# Patient Record
Sex: FEMALE | Race: WHITE | Employment: UNEMPLOYED | URBAN - METROPOLITAN AREA
[De-identification: names, ages, dates, MRNs, and addresses within clinical notes are randomized per-mention and may not be internally consistent; named-entity substitution may affect disease eponyms.]

---

## 2022-04-08 ENCOUNTER — HOSPITAL ENCOUNTER (OUTPATIENT)
Dept: LAB | Age: 67
Discharge: HOME | End: 2022-04-08
Payer: MEDICARE

## 2022-04-08 LAB
25(OH)D3 SERPL-MCNC: 32.7 NG/ML (ref 30–100)
ALBUMIN SERPL-MCNC: 4.2 G/DL (ref 3.4–5)
ALBUMIN/GLOB SERPL: 1.5 {RATIO} (ref 0.8–1.7)
ALP SERPL-CCNC: 75 U/L (ref 45–117)
ALT SERPL-CCNC: 30 U/L (ref 13–56)
ANION GAP SERPL CALC-SCNC: 10 MMOL/L (ref 3–18)
APPEARANCE UR: ABNORMAL
AST SERPL-CCNC: 26 U/L (ref 10–38)
BACTERIA URNS QL MICRO: NEGATIVE /HPF
BILIRUB SERPL-MCNC: 0.5 MG/DL (ref 0.2–1)
BILIRUB UR QL: NEGATIVE
BUN SERPL-MCNC: 39 MG/DL (ref 7–18)
BUN/CREAT SERPL: 29 (ref 12–20)
CALCIUM SERPL-MCNC: 9.8 MG/DL (ref 8.5–10.1)
CHLORIDE SERPL-SCNC: 114 MMOL/L (ref 100–111)
CHOLEST SERPL-MCNC: 153 MG/DL
CO2 SERPL-SCNC: 22 MMOL/L (ref 21–32)
COLOR UR: ABNORMAL
CREAT SERPL-MCNC: 1.33 MG/DL (ref 0.6–1.3)
CRP SERPL HS-MCNC: 0.3 MG/L
EPITH CASTS URNS QL MICRO: NORMAL /LPF (ref 0–5)
ERYTHROCYTE [DISTWIDTH] IN BLOOD BY AUTOMATED COUNT: 13.1 % (ref 11.6–14.5)
ERYTHROCYTE [SEDIMENTATION RATE] IN BLOOD: 10 MM/HR (ref 0–30)
FERRITIN SERPL-MCNC: 126 NG/ML (ref 8–388)
FOLATE SERPL-MCNC: >20 NG/ML (ref 3.1–17.5)
GLOBULIN SER CALC-MCNC: 2.8 G/DL (ref 2–4)
GLUCOSE SERPL-MCNC: 91 MG/DL (ref 74–99)
GLUCOSE UR STRIP.AUTO-MCNC: NEGATIVE MG/DL
HCT VFR BLD AUTO: 33.8 % (ref 35–45)
HDLC SERPL-MCNC: 65 MG/DL (ref 40–60)
HDLC SERPL: 2.4 {RATIO} (ref 0–5)
HGB BLD-MCNC: 10.9 G/DL (ref 12–16)
HGB UR QL STRIP: NEGATIVE
KETONES UR QL STRIP.AUTO: ABNORMAL MG/DL
LDLC SERPL CALC-MCNC: 77.4 MG/DL (ref 0–100)
LEUKOCYTE ESTERASE UR QL STRIP.AUTO: NEGATIVE
LIPID PROFILE,FLP: ABNORMAL
MAGNESIUM SERPL-MCNC: 2.4 MG/DL (ref 1.6–2.6)
MCH RBC QN AUTO: 31.6 PG (ref 24–34)
MCHC RBC AUTO-ENTMCNC: 32.2 G/DL (ref 31–37)
MCV RBC AUTO: 98 FL (ref 78–100)
NITRITE UR QL STRIP.AUTO: NEGATIVE
NRBC # BLD: 0 K/UL (ref 0–0.01)
NRBC BLD-RTO: 0 PER 100 WBC
PH UR STRIP: 5 [PH] (ref 5–8)
PLATELET # BLD AUTO: 253 K/UL (ref 135–420)
PMV BLD AUTO: 13.5 FL (ref 9.2–11.8)
POTASSIUM SERPL-SCNC: 3.7 MMOL/L (ref 3.5–5.5)
PROT SERPL-MCNC: 7 G/DL (ref 6.4–8.2)
PROT UR STRIP-MCNC: ABNORMAL MG/DL
RBC # BLD AUTO: 3.45 M/UL (ref 4.2–5.3)
RBC #/AREA URNS HPF: NEGATIVE /HPF (ref 0–5)
RHEUMATOID FACT SERPL-ACNC: <10 IU/ML
SODIUM SERPL-SCNC: 146 MMOL/L (ref 136–145)
SP GR UR REFRACTOMETRY: 1.02 (ref 1–1.03)
TRIGL SERPL-MCNC: 53 MG/DL (ref ?–150)
TSH SERPL DL<=0.05 MIU/L-ACNC: 0.88 UIU/ML (ref 0.36–3.74)
UROBILINOGEN UR QL STRIP.AUTO: 0.2 EU/DL (ref 0.2–1)
VIT B12 SERPL-MCNC: 953 PG/ML (ref 211–911)
VLDLC SERPL CALC-MCNC: 10.6 MG/DL
WBC # BLD AUTO: 8.6 K/UL (ref 4.6–13.2)
WBC URNS QL MICRO: NEGATIVE /HPF (ref 0–4)

## 2022-04-08 PROCEDURE — 82626 DEHYDROEPIANDROSTERONE: CPT

## 2022-04-08 PROCEDURE — 83735 ASSAY OF MAGNESIUM: CPT

## 2022-04-08 PROCEDURE — 81001 URINALYSIS AUTO W/SCOPE: CPT

## 2022-04-08 PROCEDURE — 86003 ALLG SPEC IGE CRUDE XTRC EA: CPT

## 2022-04-08 PROCEDURE — 82607 VITAMIN B-12: CPT

## 2022-04-08 PROCEDURE — 82728 ASSAY OF FERRITIN: CPT

## 2022-04-08 PROCEDURE — 86200 CCP ANTIBODY: CPT

## 2022-04-08 PROCEDURE — 36415 COLL VENOUS BLD VENIPUNCTURE: CPT

## 2022-04-08 PROCEDURE — 83789 MASS SPECTROMETRY QUAL/QUAN: CPT

## 2022-04-08 PROCEDURE — 86225 DNA ANTIBODY NATIVE: CPT

## 2022-04-08 PROCEDURE — 85027 COMPLETE CBC AUTOMATED: CPT

## 2022-04-08 PROCEDURE — 85652 RBC SED RATE AUTOMATED: CPT

## 2022-04-08 PROCEDURE — 84402 ASSAY OF FREE TESTOSTERONE: CPT

## 2022-04-08 PROCEDURE — 82784 ASSAY IGA/IGD/IGG/IGM EACH: CPT

## 2022-04-08 PROCEDURE — 82306 VITAMIN D 25 HYDROXY: CPT

## 2022-04-08 PROCEDURE — 84443 ASSAY THYROID STIM HORMONE: CPT

## 2022-04-08 PROCEDURE — 84146 ASSAY OF PROLACTIN: CPT

## 2022-04-08 PROCEDURE — 80061 LIPID PANEL: CPT

## 2022-04-08 PROCEDURE — 86376 MICROSOMAL ANTIBODY EACH: CPT

## 2022-04-08 PROCEDURE — 80053 COMPREHEN METABOLIC PANEL: CPT

## 2022-04-08 PROCEDURE — 86431 RHEUMATOID FACTOR QUANT: CPT

## 2022-04-08 PROCEDURE — 86141 C-REACTIVE PROTEIN HS: CPT

## 2022-04-09 LAB — PROLACTIN SERPL-MCNC: 8.8 NG/ML

## 2022-04-10 LAB — THYROPEROXIDASE AB SERPL-ACNC: <8 IU/ML (ref 0–34)

## 2022-04-11 LAB
CENTROMERE B AB SER-ACNC: <0.2 AI (ref 0–0.9)
CHROMATIN AB SERPL-ACNC: <0.2 AI (ref 0–0.9)
DSDNA AB SER-ACNC: 1 IU/ML (ref 0–9)
ENA JO1 AB SER-ACNC: <0.2 AI (ref 0–0.9)
ENA RNP AB SER-ACNC: 0.2 AI (ref 0–0.9)
ENA SCL70 AB SER-ACNC: <0.2 AI (ref 0–0.9)
ENA SM AB SER-ACNC: <0.2 AI (ref 0–0.9)
ENA SS-A AB SER-ACNC: <0.2 AI (ref 0–0.9)
ENA SS-B AB SER-ACNC: <0.2 AI (ref 0–0.9)
SEE BELOW, 164869: NORMAL

## 2022-04-12 LAB
ALBUMIN SERPL ELPH-MCNC: 4.2 G/DL (ref 2.9–4.4)
ALBUMIN/GLOB SERPL: 1.7 {RATIO} (ref 0.7–1.7)
ALPHA1 GLOB SERPL ELPH-MCNC: 0.3 G/DL (ref 0–0.4)
ALPHA2 GLOB SERPL ELPH-MCNC: 0.7 G/DL (ref 0.4–1)
B-GLOBULIN SERPL ELPH-MCNC: 0.9 G/DL (ref 0.7–1.3)
GAMMA GLOB SERPL ELPH-MCNC: 0.7 G/DL (ref 0.4–1.8)
GLOBULIN SER-MCNC: 2.6 G/DL (ref 2.2–3.9)
IGA SERPL-MCNC: 147 MG/DL (ref 87–352)
IGG SERPL-MCNC: 696 MG/DL (ref 586–1602)
IGM SERPL-MCNC: 114 MG/DL (ref 26–217)
INTERPRETATION SERPL IEP-IMP: NORMAL
M PROTEIN SERPL ELPH-MCNC: NORMAL G/DL
PROT SERPL-MCNC: 6.8 G/DL (ref 6–8.5)

## 2022-04-13 LAB
CCP IGA+IGG SERPL IA-ACNC: 8 UNITS (ref 0–19)
TESTOST FREE SERPL-MCNC: 1.8 PG/ML (ref 0–4.2)

## 2022-04-15 LAB — IODINE SERPL-MCNC: 49.3 UG/L (ref 40–92)

## 2022-04-17 LAB — DHEA SERPL-MCNC: 192 NG/DL (ref 31–701)

## 2022-04-19 LAB
ALMOND IGE QN: <0.1 KU/L
BRAZIL NUT: <0.1 KU/L
CASHEW NUT: <0.1 KU/L
CLAM IGE QN: <0.1 KU/L
CLASS DESCRIPTION, 600268: NORMAL
CODFISH IGE QN: <0.1 KU/L
CORN IGE QN: <0.1 KU/L
EGG WHITE,FOOD4: <0.1 KU/L
F013 IGE PEANUT: <0.1 KU/L
HAZELNUT (FILBERT): <0.1 KU/L
MACADAMIA IGE QN: <0.1 KU/L
MILK,FOOD1: <0.1 KU/L
PECAN/HICK NUT IGE QN: <0.1 KU/L
PISTACHIO IGE QN: <0.1 KU/L
SCALLOP IGE QN: <0.1 KU/L
SESAME SEED IGE QN: <0.1 KU/L
SHRIMP IGE QN: <0.1 KU/L
SOYBEAN IGE QN: <0.1 KU/L
WALNUT: <0.1 KU/L
WHEAT IGE QN: <0.1 KU/L

## 2025-02-24 ENCOUNTER — OFFICE VISIT (OUTPATIENT)
Age: 70
End: 2025-02-24
Payer: MEDICARE

## 2025-02-24 VITALS — TEMPERATURE: 99.3 F | WEIGHT: 135 LBS | HEIGHT: 67 IN | BODY MASS INDEX: 21.19 KG/M2

## 2025-02-24 DIAGNOSIS — G89.29 CHRONIC PAIN OF RIGHT KNEE: ICD-10-CM

## 2025-02-24 DIAGNOSIS — M25.461 EFFUSION OF RIGHT KNEE: ICD-10-CM

## 2025-02-24 DIAGNOSIS — M25.561 CHRONIC PAIN OF RIGHT KNEE: ICD-10-CM

## 2025-02-24 DIAGNOSIS — M25.661 DECREASED RANGE OF MOTION (ROM) OF RIGHT KNEE: ICD-10-CM

## 2025-02-24 DIAGNOSIS — M23.8X1 CREPITUS OF JOINT OF RIGHT KNEE: ICD-10-CM

## 2025-02-24 DIAGNOSIS — M17.11 ARTHRITIS OF RIGHT KNEE: Primary | ICD-10-CM

## 2025-02-24 DIAGNOSIS — M21.161 VARUS DEFORMITY, NOT ELSEWHERE CLASSIFIED, RIGHT KNEE: ICD-10-CM

## 2025-02-24 PROCEDURE — 73562 X-RAY EXAM OF KNEE 3: CPT | Performed by: PHYSICIAN ASSISTANT

## 2025-02-24 PROCEDURE — 99204 OFFICE O/P NEW MOD 45 MIN: CPT | Performed by: PHYSICIAN ASSISTANT

## 2025-02-24 PROCEDURE — 1125F AMNT PAIN NOTED PAIN PRSNT: CPT | Performed by: PHYSICIAN ASSISTANT

## 2025-02-24 PROCEDURE — 1123F ACP DISCUSS/DSCN MKR DOCD: CPT | Performed by: PHYSICIAN ASSISTANT

## 2025-02-24 NOTE — PROGRESS NOTES
[facility] AMBULATORY OFFICE NOTE    Patient: Vanessa Martinez                MRN: [id number]       SSN: [id number]  YOB: 1955        AGE: 69 y.o.        SEX: female      OFFICE NOTE DICTATED    PCP: Alisha Thornton MD  02/24/25    Chief Complaint   Patient presents with    Knee Pain     right       HISTORY:    Vanessa Martinez is a 69 y.o. female presents to the office accompanied by her  with a complaint of worsening right inner knee pain.  She was seen previous by [facility] and provided gel injections for osteoarthritic symptoms of the right knee.  They were not successful in managing her symptoms.  She does use an advanced a rollator for ambulation assistance.  She has a history of Parkinson's.  She is currently in physical therapy for balance gait training and Parkinson's specific activities.  She does have a paddle device that she uses twice daily for mild cardiovascular activities.  She is a limited home ambulator.  Pain is primarily over the inner portion of her right knee.  She has a history of using cortisone injections for her knee pain which have been generally unsuccessful.        No results found for: \"HBA1C\", \"[id number]\"      2/24/2025     2:14 PM 12/17/2024     3:40 PM 6/17/2024     2:42 PM 3/2/2023     2:34 PM 9/17/2021    10:30 AM 3/23/2021     9:38 AM   Weight Metrics   Weight 135 lb 135 lb 135 lb 130 lb 130 lb 130 lb   BMI (Calculated) 21.2 kg/m2 21.2 kg/m2 21.2 kg/m2 20.4 kg/m2 20.4 kg/m2 20.4 kg/m2          Problem List Items Addressed This Visit    None  Visit Diagnoses       Chronic pain of right knee    -  Primary    Relevant Orders    [47840] Knee 3V (Completed)    Varus deformity, not elsewhere classified, right knee        Crepitus of joint of right knee        Decreased range of motion (ROM) of right knee        Effusion of right knee        Arthritis of right knee                PAST MEDICAL HISTORY:       Diagnosis Date

## 2025-02-27 ENCOUNTER — TELEPHONE (OUTPATIENT)
Age: 70
End: 2025-02-27

## 2025-02-27 NOTE — TELEPHONE ENCOUNTER
Patient says it should have been an rx for Voltaren gel sent to the pharmacy.  She says she'll be in the area over by [facility] tomorrow.     Please advise the patient once it has been sent.  Patient can be reached at [phone].    Pharmacy:    [facility] - [address] - P [phone] - F [phone] [[id number]]

## 2025-04-04 ENCOUNTER — OFFICE VISIT (OUTPATIENT)
Age: 70
End: 2025-04-04

## 2025-04-04 ENCOUNTER — HOSPITAL ENCOUNTER (OUTPATIENT)
Facility: HOSPITAL | Age: 70
Discharge: HOME | End: 2025-04-04
Payer: MEDICARE
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
## 2025-04-04 DIAGNOSIS — M21.161 VARUS DEFORMITY, NOT ELSEWHERE CLASSIFIED, RIGHT KNEE: ICD-10-CM

## 2025-04-04 DIAGNOSIS — G89.29 CHRONIC PAIN OF RIGHT KNEE: ICD-10-CM

## 2025-04-04 DIAGNOSIS — M23.8X1 CREPITUS OF JOINT OF RIGHT KNEE: ICD-10-CM

## 2025-04-04 DIAGNOSIS — M17.11 ARTHRITIS OF RIGHT KNEE: ICD-10-CM

## 2025-04-04 DIAGNOSIS — M25.661 DECREASED RANGE OF MOTION (ROM) OF RIGHT KNEE: ICD-10-CM

## 2025-04-04 DIAGNOSIS — M17.11 ARTHRITIS OF RIGHT KNEE: Primary | ICD-10-CM

## 2025-04-04 DIAGNOSIS — M25.561 CHRONIC PAIN OF RIGHT KNEE: ICD-10-CM

## 2025-04-04 PROCEDURE — 73700 CT LOWER EXTREMITY W/O DYE: CPT

## 2025-04-04 NOTE — PROGRESS NOTES
[facility] AMBULATORY OFFICE NOTE    Patient: Vanessa Martinez                MRN: [id number]       SSN: [id number]  YOB: 1955        AGE: 70 y.o.        SEX: female      OFFICE NOTE DICTATED    PCP: Alisha Thornton MD  04/04/25 4/5/2025: Patient follows up regarding her right knee.  She was previously seen and found to have tricompartmental osteoarthritis of the right knee with complete collapse of the medial joint space.  She is receiving care physical therapy wise in general medicine from [facility] in [facility].  She was provided a medial  brace by [facility] for the right knee.  Today she endorses a fall at home a couple weeks ago resulting in increased medial right knee pain.  There was no loss of conscious at the time of or following the fall.    Chief Complaint   Patient presents with    Follow-up     Right knee pain       HISTORY:    Vanessa Martinez is a 70 y.o. female presents to the office accompanied by her  with a complaint of worsening right inner knee pain.  She was seen previous by [facility] and provided gel injections for osteoarthritic symptoms of the right knee.  They were not successful in managing her symptoms.  She does use an advanced a rollator for ambulation assistance.  She has a history of Parkinson's.  She is currently in physical therapy for balance gait training and Parkinson's specific activities.  She does have a paddle device that she uses twice daily for mild cardiovascular activities.  She is a limited home ambulator.  Pain is primarily over the inner portion of her right knee.  She has a history of using cortisone injections for her knee pain which have been generally unsuccessful.        No results found for: \"HBA1C\", \"[id number]\"      2/24/2025     2:14 PM 12/17/2024     3:40 PM 6/17/2024     2:42 PM 3/2/2023     2:34 PM 9/17/2021    10:30 AM 3/23/2021     9:38 AM

## 2025-04-04 NOTE — PROGRESS NOTES
STAT CT of right knee  Scheduled on : 4/4/2025  Time: 230pm  Check in time: 2pm  Place: [facility]    Advised patient of the above and to bring picture ID, insurance card, mask and to check in at .

## 2025-04-15 ENCOUNTER — TELEPHONE (OUTPATIENT)
Age: 70
End: 2025-04-15

## 2025-04-15 NOTE — TELEPHONE ENCOUNTER
Patient called in and ask if she should be seen for the crackling in her knee, some swelling    Please advise patient @ [phone]

## 2025-04-18 ENCOUNTER — OFFICE VISIT (OUTPATIENT)
Age: 70
End: 2025-04-18

## 2025-04-18 DIAGNOSIS — M17.11 ARTHRITIS OF RIGHT KNEE: Primary | ICD-10-CM

## 2025-04-18 NOTE — PROGRESS NOTES
[facility] AMBULATORY OFFICE NOTE    Patient: Vanessa Martinez                MRN: [id number]       SSN: [id number]  YOB: 1955        AGE: 70 y.o.        SEX: female      OFFICE NOTE DICTATED    PCP: Blossom Santiago MD  04/18/25 4/18/2025: Patient returns for follow-up regarding her right knee.  She had a CT scan that was done following her last visit which revealed an age-indeterminate fracture of the lateral aspect of the tibial spine with associated interarticular loose body.  She was placed in a brace and recommended to limit her running and jumping following her last visit on 4/5/2025.  She has been compliant with recommendations.    CT scan as below:    CT KNEE RIGHT WO CONTRAST  Order: [id number]   Status: Final result       Next appt: 06/04/2025 at 02:00 PM in Neurology (Sawyer Mandel MD)       Dx: Arthritis of right knee; Fall, initia...    Test Result Released: Yes (not seen)    0 Result Notes  Details    Reading Physician Reading Date Result Priority   Fabianchrissy Kingandreia BLOUNT DO  [phone]     4/4/2025      Narrative & Impression  STUDY: CT KNEE RIGHT WO CONTRAST       HISTORY: Unilateral primary osteoarthritis, right knee; Unspecified fall,  initial encounter     TECHNIQUE: CT RIGHT KNEE without intravenous contrast.  Multiplanar  reconstructions performed and submitted. All CT scans at this facility are  performed using dose optimization technique as appropriate to the performed  examination, to include automated exposure control, adjustment of the mA and/or  kV according to patient's size (including appropriate matching for site-specific  examinations), or use of an iterative reconstruction technique.     COMPARISON(S): None available.       FINDINGS:      There is age-indeterminate fracture of the lateral aspect of the tibial spine  (series 601, image #37; series 602, image #37) with associated intra-articular  loose body. There is mild concavity

## 2025-06-02 ENCOUNTER — OFFICE VISIT (OUTPATIENT)
Age: 70
End: 2025-06-02
Payer: MEDICARE

## 2025-06-02 DIAGNOSIS — M17.11 ARTHRITIS OF RIGHT KNEE: Primary | ICD-10-CM

## 2025-06-02 PROCEDURE — 1123F ACP DISCUSS/DSCN MKR DOCD: CPT | Performed by: PHYSICIAN ASSISTANT

## 2025-06-02 PROCEDURE — 99214 OFFICE O/P EST MOD 30 MIN: CPT | Performed by: PHYSICIAN ASSISTANT

## 2025-06-02 PROCEDURE — 20610 DRAIN/INJ JOINT/BURSA W/O US: CPT | Performed by: PHYSICIAN ASSISTANT

## 2025-06-02 PROCEDURE — 1125F AMNT PAIN NOTED PAIN PRSNT: CPT | Performed by: PHYSICIAN ASSISTANT

## 2025-06-02 PROCEDURE — 73562 X-RAY EXAM OF KNEE 3: CPT | Performed by: PHYSICIAN ASSISTANT

## 2025-06-02 RX ORDER — TRIAMCINOLONE ACETONIDE 40 MG/ML
80 INJECTION, SUSPENSION INTRA-ARTICULAR; INTRAMUSCULAR ONCE
Status: COMPLETED | OUTPATIENT
Start: 2025-06-02 | End: 2025-06-02

## 2025-06-02 RX ORDER — BUPIVACAINE HYDROCHLORIDE 5 MG/ML
7 INJECTION, SOLUTION PERINEURAL ONCE
Status: COMPLETED | OUTPATIENT
Start: 2025-06-02 | End: 2025-06-02

## 2025-06-02 RX ADMIN — TRIAMCINOLONE ACETONIDE 80 MG: 40 INJECTION, SUSPENSION INTRA-ARTICULAR; INTRAMUSCULAR at 14:09

## 2025-06-02 RX ADMIN — BUPIVACAINE HYDROCHLORIDE 35 MG: 5 INJECTION, SOLUTION PERINEURAL at 14:09

## 2025-06-06 ENCOUNTER — TELEPHONE (OUTPATIENT)
Age: 70
End: 2025-06-06

## 2025-06-23 ENCOUNTER — OFFICE VISIT (OUTPATIENT)
Age: 70
End: 2025-06-23
Payer: MEDICARE

## 2025-06-23 VITALS — BODY MASS INDEX: 19.62 KG/M2 | HEIGHT: 67 IN | WEIGHT: 125 LBS

## 2025-06-23 DIAGNOSIS — Z91.81 AT RISK FOR FALL DUE TO COMORBID CONDITION: ICD-10-CM

## 2025-06-23 DIAGNOSIS — G20.B1 PARKINSON'S DISEASE WITH DYSKINESIA, UNSPECIFIED WHETHER MANIFESTATIONS FLUCTUATE (HCC): ICD-10-CM

## 2025-06-23 DIAGNOSIS — M17.11 PRIMARY OSTEOARTHRITIS OF RIGHT KNEE: Primary | ICD-10-CM

## 2025-06-23 PROCEDURE — 1125F AMNT PAIN NOTED PAIN PRSNT: CPT | Performed by: ORTHOPAEDIC SURGERY

## 2025-06-23 PROCEDURE — 73562 X-RAY EXAM OF KNEE 3: CPT | Performed by: ORTHOPAEDIC SURGERY

## 2025-06-23 PROCEDURE — 1123F ACP DISCUSS/DSCN MKR DOCD: CPT | Performed by: ORTHOPAEDIC SURGERY

## 2025-06-23 PROCEDURE — 99214 OFFICE O/P EST MOD 30 MIN: CPT | Performed by: ORTHOPAEDIC SURGERY

## 2025-06-23 NOTE — PROGRESS NOTES
Patient: Vanessa Martinez                MRN: [id number]       SSN: [id number]  YOB: 1955        AGE: 70 y.o.        SEX: female  BMI: Body mass index is 19.58 kg/m².    PCP: Blossom Santiago MD  06/23/25    Chief Complaint: New Patient (Right knee pain )      1. Primary osteoarthritis of right knee  -     [24498] Knee 3V  -     SCHEDULE SURGERY  2. Parkinson's disease with dyskinesia, unspecified whether manifestations fluctuate (HCC)  3. At risk for fall due to comorbid condition      HPI:  Vanessa Martinez is a 70 y.o. female New to Me  patient with chief complaint of   Chief Complaint   Patient presents with    New Patient     Right knee pain      Right knee pain referred by GREGORIO Hoffman for bone-on-bone medial compartment arthritis.  She does have a history of Parkinson's and uses a rollator for ambulation.  She has gotten moderate relief from a medial off  brace.    Known risk factors for perioperative complications:   Parkinson's disease           No data to display              Allergies   Allergen Reactions    Amoxicillin-Pot Clavulanate Rash    Cephalexin Itching and Rash    Clavulanic Acid Rash     Other reaction(s): Rash      Penicillins Rash     Other reaction(s): Rash           IMAGING:  Imaging read by myself and interpreted as follows:    June 23, 2025:  Three-view x-ray of the right knee including AP, lateral and notch view demonstrates bone-on-bone articulation with subchondral sclerosis, subchondral cysts and osteophytes in the medial compartment.  There are small osteophytes of the superior inferior pole of the patella.      PHYSICAL EXAMINATION:  Ht 1.702 m (5' 7\")   Wt 56.7 kg (125 lb)   BMI 19.58 kg/m²   Body mass index is 19.58 kg/m².  Wt Readings from Last 3 Encounters:   06/23/25 56.7 kg (125 lb)   02/24/25 61.2 kg (135 lb)   12/17/24 61.2 kg (135 lb)       GENERAL: Alert and oriented x3, in no acute distress.  HEENT: Normocephalic, atraumatic.    Heart: No

## 2025-07-09 ENCOUNTER — OFFICE VISIT (OUTPATIENT)
Age: 70
End: 2025-07-09
Payer: MEDICARE

## 2025-07-09 VITALS
BODY MASS INDEX: 17.11 KG/M2 | DIASTOLIC BLOOD PRESSURE: 63 MMHG | SYSTOLIC BLOOD PRESSURE: 109 MMHG | HEIGHT: 67 IN | TEMPERATURE: 96.6 F | HEART RATE: 70 BPM | WEIGHT: 109 LBS | OXYGEN SATURATION: 100 %

## 2025-07-09 DIAGNOSIS — G20.A1 PARKINSON'S DISEASE WITHOUT DYSKINESIA OR FLUCTUATING MANIFESTATIONS (HCC): Primary | ICD-10-CM

## 2025-07-09 PROCEDURE — 99204 OFFICE O/P NEW MOD 45 MIN: CPT | Performed by: STUDENT IN AN ORGANIZED HEALTH CARE EDUCATION/TRAINING PROGRAM

## 2025-07-09 PROCEDURE — 1123F ACP DISCUSS/DSCN MKR DOCD: CPT | Performed by: STUDENT IN AN ORGANIZED HEALTH CARE EDUCATION/TRAINING PROGRAM

## 2025-07-09 RX ORDER — ASCORBIC ACID 500 MG
500 TABLET ORAL DAILY
COMMUNITY

## 2025-07-09 RX ORDER — B-COMPLEX WITH VITAMIN C
2 TABLET ORAL DAILY
COMMUNITY

## 2025-07-09 RX ORDER — DIPHENHYDRAMINE CITRATE AND IBUPROFEN 38; 200 MG/1; MG/1
TABLET, COATED ORAL
COMMUNITY

## 2025-07-09 RX ORDER — DOCUSATE SODIUM 100 MG/1
100 CAPSULE, LIQUID FILLED ORAL 2 TIMES DAILY
COMMUNITY

## 2025-07-09 ASSESSMENT — ENCOUNTER SYMPTOMS
NAUSEA: 0
CONSTIPATION: 1
VOMITING: 0
SHORTNESS OF BREATH: 0
COUGH: 0
BACK PAIN: 1

## 2025-07-09 NOTE — PROGRESS NOTES
Vanessa Martinez is a 70 y.o. female .presents for New Patient (Parkinson's )   A 70 years old female patient referred here for evaluation of Parkinson's disease.  She came today with her .  Diagnosed with PD about 6 years ago.  She is on Sinemet 25/100, 1 tab p.o. 3 times daily.  She mentioned that her symptoms started with balance difficulty.  She feels unsteady and might be falling.  Mild shuffling.  Currently uses rollator for walking.  She tries to exercise.  She had about 4 falls; last fall was about 3 to 4 weeks ago.  Does not feel dizzy/lightheaded.  Mentioned mild stiffness over her lower extremities; no upper extremity stiffness.  Has tremor of her hands; mild.  More on the left side.  Mostly resting.  No head, jaw, lower extremity tremor.  Speech is soft;  mentioned that might be  hard to hear her.  Mild drooling from her mouth.  No difficulty swallowing; no choking.  Takes medications to sleep.  No night terrors or bad dreams.  No difficulty turning in bed.  Some difficulty standing up from her chair; needs to push herself up.  No obvious clear hallucinations; might occasionally see a shadow for example of a cat.  Has some changes in her writing; letters are small.  Mild slowness in activities like walking.  Does not need any help to put her clothes on or shower.  No changes in her sense of smell.  Has constipation.  Claims Sinemet is helping.  No dyskinesias.  No wearing off.  Has no restless legs.  No forgetfulness.    Review of Systems   Constitutional:  Positive for unexpected weight change. Negative for chills and fever.   HENT:  Negative for hearing loss and tinnitus.    Eyes:  Positive for visual disturbance (has glasses).   Respiratory:  Negative for cough and shortness of breath.    Cardiovascular:  Negative for chest pain and leg swelling.   Gastrointestinal:  Positive for constipation. Negative for nausea and vomiting.   Genitourinary:  Positive for frequency (mostly at night).